# Patient Record
Sex: MALE | Race: WHITE | Employment: FULL TIME | URBAN - METROPOLITAN AREA
[De-identification: names, ages, dates, MRNs, and addresses within clinical notes are randomized per-mention and may not be internally consistent; named-entity substitution may affect disease eponyms.]

---

## 2018-07-27 ENCOUNTER — OFFICE VISIT (OUTPATIENT)
Dept: BEHAVIORAL/MENTAL HEALTH CLINIC | Age: 53
End: 2018-07-27

## 2018-07-27 VITALS
SYSTOLIC BLOOD PRESSURE: 136 MMHG | BODY MASS INDEX: 25.71 KG/M2 | DIASTOLIC BLOOD PRESSURE: 82 MMHG | HEART RATE: 69 BPM | HEIGHT: 73 IN | WEIGHT: 194 LBS

## 2018-07-27 DIAGNOSIS — F51.05 INSOMNIA DUE TO OTHER MENTAL DISORDER: ICD-10-CM

## 2018-07-27 DIAGNOSIS — F33.1 MODERATE EPISODE OF RECURRENT MAJOR DEPRESSIVE DISORDER (HCC): Primary | ICD-10-CM

## 2018-07-27 DIAGNOSIS — F99 INSOMNIA DUE TO OTHER MENTAL DISORDER: ICD-10-CM

## 2018-07-27 DIAGNOSIS — F41.1 GENERALIZED ANXIETY DISORDER: ICD-10-CM

## 2018-07-27 RX ORDER — SERTRALINE HYDROCHLORIDE 25 MG/1
TABLET, FILM COATED ORAL DAILY
COMMUNITY
End: 2018-07-27 | Stop reason: SDUPTHER

## 2018-07-27 RX ORDER — SERTRALINE HYDROCHLORIDE 100 MG/1
100 TABLET, FILM COATED ORAL DAILY
Qty: 30 TAB | Refills: 2 | Status: SHIPPED | OUTPATIENT
Start: 2018-07-27

## 2018-07-27 RX ORDER — LORAZEPAM 0.5 MG/1
TABLET ORAL
COMMUNITY

## 2018-07-27 RX ORDER — TRAZODONE HYDROCHLORIDE 50 MG/1
50 TABLET ORAL
Qty: 30 TAB | Refills: 2 | Status: SHIPPED | OUTPATIENT
Start: 2018-07-27

## 2018-07-27 NOTE — PROGRESS NOTES
Vita Youngblood 1965 
46 y.o. 
male [place]  Chief Complaint Patient presents with  Depression  Anxiety  Insomnia [place]:  
This is a 46years old divorce  male with no formal past psychiatric history or treatment who is referred bypresented on time, was alert awake oriented to time person and place and was calm and cooperative, polite, and pleasant during the interview. Was able to provide pertinent history and was able to discuss treatment alternatives and decide about the plan. Patient was in his usual state of health until age 24 when his mother  due to cancer and he report having his first episode of major depression but he did not seek or receive any help and try to deal with the help of his dimple. He report another episode of major depression when his 32years old brother who was his role model and hero  at work when he was age 24. He report increasing anxiety and depression in the context of his job at [employer] in [place] where he works as  team for past 17 years. Patient is working with [employer] in [address] for past 17 years and report majority of his mental illness secondary to very stressful work environment. 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 Patient report moderate to severe depression with a score of 16 on PHQ 9 reporting every day symptom of anhedonia, trouble is staying sleep and going to sleep, feeling tired and having little energy, and stress eating. He report depression to the point where he is having trouble in his relationship and he quit playing bowling and softball which she likes very much.   His mood disorder questionnaire is negative and he denies any manic or hypomanic episode ever, denies ever having any hallucination. His Harrison anxiety rating scale is moderate to severe with a score of 26 mostly reporting symptoms of generalized anxiety. Patient was provided with support and psychoeducation, and after discussing risk/benefits/expectations with treatment alternatives available, patient decided to gradually increase by taking Zoloft 75 mg for next 10 days and 100 mg till return in 3 weeks for reevaluation. He is also provided with trazodone 25-50 mg at bedtime only as needed for insomnia. He denies any substance abuse ever and has support from his girlfriend. He denies any active suicidal ideation, intent, plan and agreed to get help or go to ED in case of emergency. PAST PSYCHIATRIC HISTORY: 
Patient denies any acute inpatient psychiatric hospitalization, any substance abuse detox or rehab, and denies any suicide attempt ever. FAMILY HISTORY: 
Any of first degree relatives including biological parents, siblings, first cousins on both sides, uncle/aunt on both sides, and grand parents on both sides have been diagnosed or treated for any mental illness, substance abuse or attempted/commited suicide. Patient denies any family history of mental illness or substance abuse on mother's side of the family and he does not know much about his father's side and denies any knowledge of mental illness or substance abuse. SUBSTANCE USE HISTORY:  
TOBACCO: Never smoke ALCOHOL: Drinks socially and denies abuse. CANNABIS, COCAINE, OPIOIDS, and OTHERS: Denies MEDICAL HISTORY:  
 has no past medical history on file. ALLERGIES:  
No Known Allergies VITAL SIGNS: 
/82  Pulse 69  Ht 6' 1\" (1.854 m)  Wt 88 kg (194 lb)  BMI 25.6 kg/m2 Current Outpatient Prescriptions Medication Sig Dispense Refill  LORazepam (ATIVAN) 0.5 mg tablet Take  by mouth.  sertraline (ZOLOFT) 100 mg tablet Take 1 Tab by mouth daily.  Indications: Generalized Anxiety Disorder, major depressive disorder 30 Tab 2  
 traZODone (DESYREL) 50 mg tablet Take 1 Tab by mouth nightly. Indications: insomnia associated with depression, major depressive disorder 30 Tab 2  
 rosuvastatin (CRESTOR) 10 mg tablet Take 10 mg by mouth nightly. TAKES 1/2 A TAB AT NIGHT    
 
 
ROS: 
Constitutional: Positive for fatigue Eyes: Negative for contacts/classes ENT: Negative for hearing loss and tinnitus Respiratory: Negative for cough or wheezing Cardiovascular: Negative for chest pain, palpitations Gastrointestinal: Negative for reflux symptoms and constipation Genitourinary: Negative for frequency and urinary incontinence Musculoskeletal: Negative for myalgias and arthralgias Neurological: Positive for memory problems Behavioral/psych: Positive for insomnia, anxiety, depression, negative for SI/HI Endocrine: Negative for diabetic symptoms including polyuria and polydipsia LEGAL HISTORY: 
Patient denies. SOCIAL HISTORY: 
Patient was born and raised in [place] by  parents. History of childhood abuse: Denies. Education: Associates in  from [employer] in 2001.  history: None. Marriage and children:  for 7 years and  in 2011. He has no children and has a girlfriend for past 5 years. Temple/Sprituality: Latter-day. He is working with [employer] in [employer] for past 17 years and report majority of his mental illness secondary to very stressful work environment. 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 MENTAL STATUS EXAMINATION:  
Patient is a 46 y.o. male [facility] who looks his stated age. Patient appearance is appropriately dressed with good hygiene.  Speech is regular rate and rhythm, fluent language, and thought process is linear, logical, and goal directed. Reported mood is depressed and anxious with mood congruent depressed and anxious affect. Patient denies any suicidal ideation, homicidal ideation, and auditory visual hallucination. Not observed to be delusional or paranoid. Insight, judgement, reliability and impulse control is intact. Cognition was within normal limit and patient was observed to be reliable. DIAGNOSIS: 
Encounter Diagnoses Name Primary?  Moderate episode of recurrent major depressive disorder ([facility] [address] Yes  Generalized anxiety disorder  Insomnia due to other mental disorder PLAN: 
1. MEDICATION: Gradually increase Zoloft by taking 75 mg for next 10 days and 100 mg till return and 3 weeks for reevaluation. Trazodone 25-50 mg only as needed for insomnia and Ativan 0.5 mg only as needed for anxiety, he only takes couple of times a month and was provided with #30 on May 8 and June 25. Patient was provided with psycho education, discussed risk/benefits, and expectations from medication changes. Patient agrees with plan. 2. PSYCHOTHERAPY: Patient was provided with supportive therapy, strongly encourage to seek psychotherapy. 3. MEDICAL CARE: Patient was strongly encourage to take their medical medications and follow up with their PCP on regular basis. He takes Crestor for cholesterol issues and denies any other medical issues. 4. SUBSTANCE ABUSE CARE: Patient never smoke, drinks socially, and denied any illicit drug use ever. 5. FOLLOW UP: Follow-up Disposition: 
Return in about 3 weeks (around 8/17/2018) for Medication management. Patient was seen face-to-face for more than 45 minutes and more than half of the time is spent in counseling. Patient informed that his FMLA will run out on August 1 when he will try to go back to work and will see me in 3 weeks during the week when he is scheduled for vacation.

## 2018-07-27 NOTE — MR AVS SNAPSHOT
[address] [facility] 
[phone] Patient: Dameon Garcia MRN: [id number] :1965 Visit Information Date & Time Provider Department Dept. Phone Encounter #  
 2018  9:00 AM Radha Cummings MD [address] [facility] [phone] [id number] Upcoming Health Maintenance Date Due Hepatitis C Screening 1965 DTaP/Tdap/Td series (1 - Tdap) 1985 FOBT Q 1 YEAR AGE 50-75 2014 Influenza Age 5 to Adult 2018 Allergies as of 2018  Review Complete On: 2018 By: Francesco Ferreira No Known Allergies Current Immunizations  Never Reviewed No immunizations on file. Not reviewed this visit Vitals BP Pulse Height(growth percentile) Weight(growth percentile) BMI Smoking Status 136/82 69 6' 1\" (1.854 m) 194 lb (88 kg) 25.6 kg/m2 Never Smoker Vitals History BMI and BSA Data Body Mass Index Body Surface Area  
 25.6 kg/m 2 2.13 m 2 Preferred Pharmacy [facility] Phone [facility] [address] [facility], [address] [phone] Your Updated Medication List  
  
   
This list is accurate as of 18  9:25 AM.  Always use your most recent med list.  
  
  
  
  
 CRESTOR 10 mg tablet Generic drug:  rosuvastatin Take 10 mg by mouth nightly. TAKES 1/2 A TAB AT NIGHT LORazepam 0.5 mg tablet Commonly known as:  ATIVAN Take  by mouth. sertraline 100 mg tablet Commonly known as:  ZOLOFT Take 1 Tab by mouth daily. Indications: Generalized Anxiety Disorder, major depressive disorder  
  
 traZODone 50 mg tablet Commonly known as:  Triny Davey Take 1 Tab by mouth nightly. Indications: insomnia associated with depression, major depressive disorder Prescriptions Sent to Pharmacy  Refills  
 sertraline (ZOLOFT) 100 mg tablet 2  
 Sig: Take 1 Tab by mouth daily. Indications: Generalized Anxiety Disorder, major depressive disorder Class: Normal  
 Pharmacy: [id number] [facility] [place], [place] Ph #: [phone] Route: Oral  
 traZODone (DESYREL) 50 mg tablet 2 Sig: Take 1 Tab by mouth nightly. Indications: insomnia associated with depression, major depressive disorder Class: Normal  
 Pharmacy: [id number], [address] [place] [address] Ph #: [phone] Route: Oral  
  
Introducing [facility]! [employer] introduces [employer] patient portal. Now you can access parts of your medical record, email your doctor's office, and request medication refills online. 1. In your internet browser, go to [email or web address] 2. Click on the First Time User? Click Here link in the Sign In box. You will see the New Member Sign Up page. 3. Enter your [employer] Access Code exactly as it appears below. You will not need to use this code after youve completed the sign-up process. If you do not sign up before the expiration date, you must request a new code. · [employer] Access Code: [email or web address] Expires: 10/25/2018  9:21 AM 
 
4. Enter the last four digits of your Social Security Number (xxxx) and Date of Birth (mm/dd/yyyy) as indicated and click Submit. You will be taken to the next sign-up page. 5. Create a VisualOnt ID. This will be your [employer] login ID and cannot be changed, so think of one that is secure and easy to remember. 6. Create a [employer] password. You can change your password at any time. 7. Enter your Password Reset Question and Answer. This can be used at a later time if you forget your password. 8. Enter your e-mail address. You will receive e-mail notification when new information is available in [address] 9. Click Sign Up. You can now view and download portions of your medical record. 10. Click the Download Summary menu link to download a portable copy of your medical information. If you have questions, please visit the Frequently Asked Questions section of the [employer] website. Remember, [employer] is NOT to be used for urgent needs. For medical emergencies, dial 911. Now available from your iPhone and Android! Please provide this summary of care documentation to your next provider. Your primary care clinician is listed as Rudi Posada. If you have any questions after today's visit, please call [phone].

## 2018-08-27 ENCOUNTER — OFFICE VISIT (OUTPATIENT)
Dept: BEHAVIORAL/MENTAL HEALTH CLINIC | Age: 53
End: 2018-08-27

## 2018-08-27 VITALS
HEIGHT: 73 IN | WEIGHT: 196 LBS | DIASTOLIC BLOOD PRESSURE: 59 MMHG | BODY MASS INDEX: 25.98 KG/M2 | SYSTOLIC BLOOD PRESSURE: 119 MMHG | HEART RATE: 67 BPM

## 2018-08-27 DIAGNOSIS — F41.1 GENERALIZED ANXIETY DISORDER: ICD-10-CM

## 2018-08-27 DIAGNOSIS — F99 INSOMNIA DUE TO OTHER MENTAL DISORDER: ICD-10-CM

## 2018-08-27 DIAGNOSIS — F51.05 INSOMNIA DUE TO OTHER MENTAL DISORDER: ICD-10-CM

## 2018-08-27 DIAGNOSIS — F33.1 MODERATE EPISODE OF RECURRENT MAJOR DEPRESSIVE DISORDER (HCC): Primary | ICD-10-CM

## 2018-08-27 NOTE — MR AVS SNAPSHOT
[address] [facility] 
[phone] Patient: Miguel Ángel Blanco MRN: [id number] :1965 Visit Information Date & Time Provider Department Dept. Phone Encounter #  
 2018  3:00 PM Martha West [phone] [id number] Your Appointments 2018  3:00 PM  
ESTABLISHED PATIENT with MD Martha West [address] [facility]) Appt Note: 4 week f/u; lvm 18 LJ  
 [address] [facility] [address] [address] [address] [place] [address] [facility] Upcoming Health Maintenance Date Due Hepatitis C Screening 1965 DTaP/Tdap/Td series (1 - Tdap) 1985 FOBT Q 1 YEAR AGE 50-75 2014 Influenza Age 5 to Adult 2018 Allergies as of 2018  Review Complete On: 2018 By: Tatyana Casas No Known Allergies Current Immunizations  Never Reviewed No immunizations on file. Not reviewed this visit Vitals BP Pulse Height(growth percentile) Weight(growth percentile) BMI Smoking Status 119/59 67 6' 1\" (1.854 m) 196 lb (88.9 kg) 25.86 kg/m2 Never Smoker BMI and BSA Data Body Mass Index Body Surface Area  
 25.86 kg/m 2 2.14 m 2 Preferred Pharmacy [facility] [address] [place] [address] [phone] Your Updated Medication List  
  
   
This list is accurate as of 18  2:56 PM.  Always use your most recent med list.  
  
  
  
  
 CRESTOR 10 mg tablet Generic drug:  rosuvastatin Take 10 mg by mouth nightly. TAKES 1/2 A TAB AT NIGHT LORazepam 0.5 mg tablet Commonly known as:  ATIVAN Take  by mouth. sertraline 100 mg tablet Commonly known as:  ZOLOFT Take 1 Tab by mouth daily.  Indications: Generalized Anxiety Disorder, major depressive disorder  
  
 traZODone 50 mg tablet Commonly known as:  Demond Spencer Take 1 Tab by mouth nightly. Indications: insomnia associated with depression, major depressive disorder Introducing [facility]! [employer] introduces [employer] patient portal. Now you can access parts of your medical record, email your doctor's office, and request medication refills online. 1. In your internet browser, go to [email or web address] 2. Click on the First Time User? Click Here link in the Sign In box. You will see the New Member Sign Up page. 3. Enter your [employer] Access Code exactly as it appears below. You will not need to use this code after youve completed the sign-up process. If you do not sign up before the expiration date, you must request a new code. · [employer] Access Code: [email or web address] Expires: 10/25/2018  9:21 AM 
 
4. Enter the last four digits of your Social Security Number (xxxx) and Date of Birth (mm/dd/yyyy) as indicated and click Submit. You will be taken to the next sign-up page. 5. Create a [employer] ID. This will be your [employer] login ID and cannot be changed, so think of one that is secure and easy to remember. 6. Create a [employer] password. You can change your password at any time. 7. Enter your Password Reset Question and Answer. This can be used at a later time if you forget your password. 8. Enter your e-mail address. You will receive e-mail notification when new information is available in [address] 9. Click Sign Up. You can now view and download portions of your medical record. 10. Click the Download Summary menu link to download a portable copy of your medical information. If you have questions, please visit the Frequently Asked Questions section of the [employer] website. Remember, [employer] is NOT to be used for urgent needs. For medical emergencies, dial 911. Now available from your iPhone and Android! Please provide this summary of care documentation to your next provider. Your primary care clinician is listed as Marino Ser. If you have any questions after today's visit, please call [phone].

## 2018-08-27 NOTE — PROGRESS NOTES
Sarah Smith  1965  46 y.o.  male  [facility]    Chief Complaint   Patient presents with    Insomnia     Trazodone 50 mg usually works and he has to take Ativan 0.5 mg together approximately every other night.  Anxiety     30% better with increased dose of Zoloft    Depression     30% better       [place]:   This is a 46years old divorce  male with no formal past psychiatric history or treatment who was seen for the first time on July 27, 2018 when he decided to gradually increase Zoloft to 75 mg and 100 mg, trazodone 25-50 mg at bedtime only as needed for insomnia, and is taking Ativan 0.5 mg only as needed for anxiety or initial insomnia. He presented on time, was alert awake oriented to time person and place and was calm and cooperative, polite, and pleasant during the interview.       Patient report compliance with his medication, is able to tolerate, and reported 30% improvement with symptoms of anxiety depression and his sleep is improved with the combination of trazodone 50 mg and Ativan 0.5 mg that she takes approximately every other night. He informed as his last visit that his main issue is his work which is getting better as his bolus change and now the job is better if it would decrease his stress. He said that he only have to work till next year may and it will get better after he retired.     Patient was provided with support and psychoeducation, and after discussing risk/benefits/expectations with treatment alternatives available, patient decided to  continue Zoloft 100 mg daily, trazodone 25-50 mg at bedtime only as needed for insomnia, and Ativan 0.5 mg only as needed for anxiety or initial insomnia. He denies any substance abuse ever and has support from his girlfriend.   He denies any active suicidal ideation, intent, plan and agreed to get help or go to ED in case of emergency.        SUBSTANCE USE HISTORY:   TOBACCO: Never smoke  ALCOHOL: Drinks socially and denies abuse.  CANNABIS, COCAINE, OPIOIDS, and OTHERS: Denies    MEDICAL HISTORY:    has no past medical history on file. ALLERGIES:   No Known Allergies    VITAL SIGNS:  /59  Pulse 67  Ht 6' 1\" (1.854 m)  Wt 88.9 kg (196 lb)  BMI 25.86 kg/m2    Current Outpatient Prescriptions   Medication Sig Dispense Refill    LORazepam (ATIVAN) 0.5 mg tablet Take  by mouth.  sertraline (ZOLOFT) 100 mg tablet Take 1 Tab by mouth daily. Indications: Generalized Anxiety Disorder, major depressive disorder 30 Tab 2    traZODone (DESYREL) 50 mg tablet Take 1 Tab by mouth nightly. Indications: insomnia associated with depression, major depressive disorder 30 Tab 2    rosuvastatin (CRESTOR) 10 mg tablet Take 10 mg by mouth nightly. TAKES 1/2 A TAB AT NIGHT         MENTAL STATUS EXAMINATION:   Patient is a 46 y.o. male [facility] who looks his stated age. Patient appearance is nicely dressed with good hygiene. Speech is regular rate and rhythm, fluent language, and thought process is linear, logical, and goal directed. Reported mood is better with mood congruent brighter affect. Patient denies any suicidal ideation, homicidal ideation, and auditory visual hallucination. Not observed to be delusional or paranoid. Insight, judgement, reliability and impulse control is intact. His cognition is within normal limit and he is observed to be reliable. DIAGNOSIS:  Encounter Diagnoses   Name Primary?  Moderate episode of recurrent major depressive disorder (HCC) Yes    Generalized anxiety disorder     Insomnia due to other mental disorder        PLAN:  1. MEDICATION: Patient requested to continue Zoloft 100 mg daily, trazodone 50 mg at bedtime as needed for insomnia, and Ativan 0.5 mg as needed for anxiety or initial insomnia. Patient was provided with psycho education, discussed risk/benefits, and expectations from medication changes. Patient agrees with plan.      2. PSYCHOTHERAPY: Patient was provided with supportive therapy, strongly encourage to seek psychotherapy. 3. MEDICAL CARE: Patient was strongly encourage to take their medical medications and follow up with their PCP on regular basis. Patient gained 2 pounds since last seen 1 month ago and his current weight is 196 pound. 4. SUBSTANCE ABUSE CARE: Patient denies a smoking, drinking, abusing any illicit drugs. 5. FOLLOW UP:   Follow-up Disposition:  Return in about 2 months (around 10/27/2018) for Medication management.